# Patient Record
Sex: FEMALE | Race: ASIAN | Employment: FULL TIME | URBAN - METROPOLITAN AREA
[De-identification: names, ages, dates, MRNs, and addresses within clinical notes are randomized per-mention and may not be internally consistent; named-entity substitution may affect disease eponyms.]

---

## 2024-06-17 ENCOUNTER — OFFICE VISIT (OUTPATIENT)
Age: 34
End: 2024-06-17
Payer: COMMERCIAL

## 2024-06-17 VITALS
BODY MASS INDEX: 19.88 KG/M2 | TEMPERATURE: 97.8 F | HEIGHT: 64 IN | WEIGHT: 116.44 LBS | SYSTOLIC BLOOD PRESSURE: 101 MMHG | DIASTOLIC BLOOD PRESSURE: 70 MMHG | RESPIRATION RATE: 16 BRPM | OXYGEN SATURATION: 99 % | HEART RATE: 74 BPM

## 2024-06-17 DIAGNOSIS — O21.9 NAUSEA AND VOMITING DURING PREGNANCY: ICD-10-CM

## 2024-06-17 DIAGNOSIS — Z64.0 UNWANTED PREGNANCY WITH PLANS FOR TERMINATION: ICD-10-CM

## 2024-06-17 DIAGNOSIS — Z32.00 ENCOUNTER FOR CONFIRMATION OF PREGNANCY TEST RESULT WITH PHYSICAL EXAMINATION: Primary | ICD-10-CM

## 2024-06-17 DIAGNOSIS — R12 HEARTBURN: ICD-10-CM

## 2024-06-17 DIAGNOSIS — Z11.3 SCREENING EXAMINATION FOR STD (SEXUALLY TRANSMITTED DISEASE): ICD-10-CM

## 2024-06-17 PROCEDURE — 99203 OFFICE O/P NEW LOW 30 MIN: CPT | Performed by: OBSTETRICS & GYNECOLOGY

## 2024-06-17 PROCEDURE — 76817 TRANSVAGINAL US OBSTETRIC: CPT | Performed by: OBSTETRICS & GYNECOLOGY

## 2024-06-17 RX ORDER — FAMOTIDINE 20 MG/1
20 TABLET, FILM COATED ORAL 2 TIMES DAILY
Qty: 60 TABLET | Refills: 3 | Status: SHIPPED | OUTPATIENT
Start: 2024-06-17

## 2024-06-17 RX ORDER — ONDANSETRON 4 MG/1
4 TABLET, ORALLY DISINTEGRATING ORAL 3 TIMES DAILY PRN
Qty: 30 TABLET | Refills: 1 | Status: SHIPPED | OUTPATIENT
Start: 2024-06-17

## 2024-06-17 NOTE — PROGRESS NOTES
Quyen is a 33 y.o. female who presents today for the following:    Chief Complaint   Patient presents with    New Patient     Positive home pregnancy test        No Known Allergies       Current Outpatient Medications:     ondansetron (ZOFRAN-ODT) 4 MG disintegrating tablet, Take 1 tablet by mouth 3 times daily as needed for Nausea or Vomiting, Disp: 30 tablet, Rfl: 1    famotidine (PEPCID) 20 MG tablet, Take 1 tablet by mouth 2 times daily, Disp: 60 tablet, Rfl: 3     History reviewed. No pertinent past medical history.     History reviewed. No pertinent surgical history.     Family History   Problem Relation Age of Onset    Hypertension Father         Social History     Socioeconomic History    Marital status:      Spouse name: None    Number of children: None    Years of education: None    Highest education level: None   Tobacco Use    Smoking status: Never    Smokeless tobacco: Never   Vaping Use    Vaping Use: Never used   Substance and Sexual Activity    Alcohol use: Never    Drug use: Never    Sexual activity: Yes     Partners: Male        HPI   33 years old -0-1-1 with LMP: 2024, GABY: 2025, amenorrhea 6-17 weeks.  Patient complains of nausea/vomiting and heartburn.  This is an unwanted pregnancy with plans for termination.    Review of Systems     REVIEW OF SYSTEMS:  Constitutional: Negative  HEENT: Negative  Eyes: Negative  Respiratory: Negative  Cardiovascular: Negative  Gastrointestinal: Positive for nausea, vomits, heartburn.  Genitourinary: Negative  Musculoskeletal: Negative  Skin: Negative  Neurological: Negative  Endo/heme: Negative  Psychiatric/behavioral: Negative    Vitals:    24 1414   BP: 101/70   Pulse: 74   Resp: 16   Temp: 97.8 °F (36.6 °C)   SpO2: 99%      OBGyn Exam   Constitutional  .  Appearance: Well-nourished, well-developed, alert, in no acute distress    HEENT  .  Head and face: Appears normal    Neck  .  Inspection/palpation: Normal appearance, no masses or

## 2024-06-20 LAB
C TRACH RRNA SPEC QL NAA+PROBE: NEGATIVE
N GONORRHOEA RRNA SPEC QL NAA+PROBE: NEGATIVE
T VAGINALIS RRNA SPEC QL NAA+PROBE: NEGATIVE

## 2024-08-14 ENCOUNTER — OFFICE VISIT (OUTPATIENT)
Age: 34
End: 2024-08-14
Payer: COMMERCIAL

## 2024-08-14 VITALS
BODY MASS INDEX: 21 KG/M2 | SYSTOLIC BLOOD PRESSURE: 118 MMHG | WEIGHT: 118.5 LBS | HEIGHT: 63 IN | RESPIRATION RATE: 16 BRPM | OXYGEN SATURATION: 98 % | DIASTOLIC BLOOD PRESSURE: 77 MMHG | HEART RATE: 73 BPM

## 2024-08-14 DIAGNOSIS — Z33.2: Primary | ICD-10-CM

## 2024-08-14 DIAGNOSIS — N94.89 SUPPRESSION OF MENSES: ICD-10-CM

## 2024-08-14 PROCEDURE — 99213 OFFICE O/P EST LOW 20 MIN: CPT | Performed by: OBSTETRICS & GYNECOLOGY

## 2024-08-14 RX ORDER — NORELGESTROMIN AND ETHINYL ESTRADIOL 35; 150 UG/MG; UG/MG
1 PATCH TRANSDERMAL WEEKLY
Qty: 9 PATCH | Refills: 4 | Status: SHIPPED | OUTPATIENT
Start: 2024-08-14

## 2024-08-14 NOTE — PROGRESS NOTES
Quyen is a 34 y.o. female who presents today for the following:    Chief Complaint   Patient presents with    Follow-up     Terminated a pregnancy 6/21/24; wants to discuss birth control        No Known Allergies       Current Outpatient Medications:     norelgestromin-ethinyl estradiol (XULANE) 150-35 MCG/24HR, Place 1 patch onto the skin once a week, Disp: 9 patch, Rfl: 4    ondansetron (ZOFRAN-ODT) 4 MG disintegrating tablet, Take 1 tablet by mouth 3 times daily as needed for Nausea or Vomiting, Disp: 30 tablet, Rfl: 1    famotidine (PEPCID) 20 MG tablet, Take 1 tablet by mouth 2 times daily, Disp: 60 tablet, Rfl: 3     History reviewed. No pertinent past medical history.     History reviewed. No pertinent surgical history.     Family History   Problem Relation Age of Onset    Hypertension Father         Social History     Socioeconomic History    Marital status:      Spouse name: None    Number of children: None    Years of education: None    Highest education level: None   Tobacco Use    Smoking status: Never    Smokeless tobacco: Never   Vaping Use    Vaping status: Never Used   Substance and Sexual Activity    Alcohol use: Never    Drug use: Never    Sexual activity: Yes     Partners: Male        HPI   Here today for follow-up after termination of pregnancy.  Patient interested in menstrual cycle control treatment options.  She has no complaints today.    Review of Systems     REVIEW OF SYSTEMS:  Constitutional: Negative  HEENT: Negative  Eyes: Negative  Respiratory: Negative  Cardiovascular: Negative  Gastrointestinal: Negative  Genitourinary: Negative  Musculoskeletal: Negative  Skin: Negative  Neurological: Negative  Endo/heme: Negative  Psychiatric/behavioral: Negative    Vitals:    08/14/24 1408   BP: 118/77   Pulse: 73   Resp: 16   SpO2: 98%      OBGyn Exam   Constitutional  .  Appearance: Well-nourished, well-developed, alert, in no acute distress    HEENT  .  Head and face: Appears

## 2024-08-15 DIAGNOSIS — O02.81 INAPPROPRIATE CHANGE IN QUANTITATIVE HCG IN EARLY PREGNANCY: Primary | ICD-10-CM

## 2024-08-15 DIAGNOSIS — Z33.2: ICD-10-CM

## 2024-08-15 LAB — HCG INTACT+B SERPL-ACNC: 252 MIU/ML

## 2024-08-21 LAB — HCG INTACT+B SERPL-ACNC: 106 MIU/ML

## 2024-08-21 NOTE — RESULT ENCOUNTER NOTE
Decreasing hCG, as expected after pregnancy termination.  Weekly testing until negative results advised.  Patient has a standing order in place.  Please advise to retest in 1 week.  Thank you

## 2024-08-23 ENCOUNTER — OFFICE VISIT (OUTPATIENT)
Age: 34
End: 2024-08-23

## 2024-08-23 DIAGNOSIS — Z33.2: Primary | ICD-10-CM

## 2024-09-02 SDOH — HEALTH STABILITY: PHYSICAL HEALTH: ON AVERAGE, HOW MANY MINUTES DO YOU ENGAGE IN EXERCISE AT THIS LEVEL?: 20 MIN

## 2024-09-02 SDOH — HEALTH STABILITY: PHYSICAL HEALTH: ON AVERAGE, HOW MANY DAYS PER WEEK DO YOU ENGAGE IN MODERATE TO STRENUOUS EXERCISE (LIKE A BRISK WALK)?: 2 DAYS

## 2024-09-03 ENCOUNTER — OFFICE VISIT (OUTPATIENT)
Facility: CLINIC | Age: 34
End: 2024-09-03

## 2024-09-03 VITALS
OXYGEN SATURATION: 100 % | RESPIRATION RATE: 17 BRPM | TEMPERATURE: 98.3 F | SYSTOLIC BLOOD PRESSURE: 118 MMHG | DIASTOLIC BLOOD PRESSURE: 76 MMHG | HEART RATE: 85 BPM | HEIGHT: 63 IN | BODY MASS INDEX: 21.09 KG/M2 | WEIGHT: 119 LBS

## 2024-09-03 DIAGNOSIS — R10.9 ABDOMINAL PAIN, UNSPECIFIED ABDOMINAL LOCATION: ICD-10-CM

## 2024-09-03 DIAGNOSIS — Z76.89 ENCOUNTER TO ESTABLISH CARE: Primary | ICD-10-CM

## 2024-09-03 DIAGNOSIS — R14.0 BLOATING: ICD-10-CM

## 2024-09-03 DIAGNOSIS — Z13.31 DEPRESSION SCREEN: ICD-10-CM

## 2024-09-03 ASSESSMENT — PATIENT HEALTH QUESTIONNAIRE - PHQ9
SUM OF ALL RESPONSES TO PHQ9 QUESTIONS 1 & 2: 0
SUM OF ALL RESPONSES TO PHQ QUESTIONS 1-9: 0
2. FEELING DOWN, DEPRESSED OR HOPELESS: NOT AT ALL
SUM OF ALL RESPONSES TO PHQ QUESTIONS 1-9: 0
1. LITTLE INTEREST OR PLEASURE IN DOING THINGS: NOT AT ALL

## 2024-09-06 DIAGNOSIS — O03.9 COMPLETE ABORTION: Primary | ICD-10-CM

## 2024-09-06 DIAGNOSIS — O03.4 INCOMPLETE ABORTION: ICD-10-CM

## 2024-09-07 LAB
ALBUMIN SERPL-MCNC: 4.1 G/DL (ref 3.9–4.9)
ALP SERPL-CCNC: 49 IU/L (ref 44–121)
ALT SERPL-CCNC: 17 IU/L (ref 0–32)
AST SERPL-CCNC: 19 IU/L (ref 0–40)
BASOPHILS # BLD AUTO: 0.1 X10E3/UL (ref 0–0.2)
BASOPHILS NFR BLD AUTO: 1 %
BILIRUB SERPL-MCNC: 0.7 MG/DL (ref 0–1.2)
BUN SERPL-MCNC: 16 MG/DL (ref 6–20)
BUN/CREAT SERPL: 21 (ref 9–23)
CALCIUM SERPL-MCNC: 9.5 MG/DL (ref 8.7–10.2)
CHLORIDE SERPL-SCNC: 102 MMOL/L (ref 96–106)
CO2 SERPL-SCNC: 25 MMOL/L (ref 20–29)
CREAT SERPL-MCNC: 0.75 MG/DL (ref 0.57–1)
EGFRCR SERPLBLD CKD-EPI 2021: 108 ML/MIN/1.73
EOSINOPHIL # BLD AUTO: 0.1 X10E3/UL (ref 0–0.4)
EOSINOPHIL NFR BLD AUTO: 1 %
ERYTHROCYTE [DISTWIDTH] IN BLOOD BY AUTOMATED COUNT: 12.1 % (ref 11.7–15.4)
GLOBULIN SER CALC-MCNC: 2.8 G/DL (ref 1.5–4.5)
GLUCOSE SERPL-MCNC: 94 MG/DL (ref 70–99)
HCG INTACT+B SERPL-ACNC: 50 MIU/ML
HCT VFR BLD AUTO: 40.3 % (ref 34–46.6)
HGB BLD-MCNC: 13 G/DL (ref 11.1–15.9)
IMM GRANULOCYTES # BLD AUTO: 0 X10E3/UL (ref 0–0.1)
IMM GRANULOCYTES NFR BLD AUTO: 0 %
LYMPHOCYTES # BLD AUTO: 2.3 X10E3/UL (ref 0.7–3.1)
LYMPHOCYTES NFR BLD AUTO: 29 %
MCH RBC QN AUTO: 30.4 PG (ref 26.6–33)
MCHC RBC AUTO-ENTMCNC: 32.3 G/DL (ref 31.5–35.7)
MCV RBC AUTO: 94 FL (ref 79–97)
MONOCYTES # BLD AUTO: 0.5 X10E3/UL (ref 0.1–0.9)
MONOCYTES NFR BLD AUTO: 6 %
NEUTROPHILS # BLD AUTO: 4.9 X10E3/UL (ref 1.4–7)
NEUTROPHILS NFR BLD AUTO: 63 %
PLATELET # BLD AUTO: 217 X10E3/UL (ref 150–450)
POTASSIUM SERPL-SCNC: 4.3 MMOL/L (ref 3.5–5.2)
PROT SERPL-MCNC: 6.9 G/DL (ref 6–8.5)
RBC # BLD AUTO: 4.27 X10E6/UL (ref 3.77–5.28)
SODIUM SERPL-SCNC: 139 MMOL/L (ref 134–144)
WBC # BLD AUTO: 7.8 X10E3/UL (ref 3.4–10.8)

## 2024-09-08 DIAGNOSIS — Z33.2: ICD-10-CM

## 2024-10-26 LAB — HCG INTACT+B SERPL-ACNC: <1 MIU/ML
